# Patient Record
Sex: FEMALE | URBAN - METROPOLITAN AREA
[De-identification: names, ages, dates, MRNs, and addresses within clinical notes are randomized per-mention and may not be internally consistent; named-entity substitution may affect disease eponyms.]

---
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
## 2024-05-17 ENCOUNTER — TELEPHONE (OUTPATIENT)
Age: 19
End: 2024-05-17

## 2024-05-17 NOTE — TELEPHONE ENCOUNTER
Bear PATEL [facility] Clinical Staff (supporting Fadi Jay MD)1 hour ago (10:07 AM)     LETITIA  Great. Yes would be great if your admin could call her and start the process. Her name is Heydi “Deangelo Duncan. She goes by Sera Duncan. Her number is [phone]        MD Bear Coronado1 hour ago (9:59 AM)       Happy to do it - frequently make exceptions for family members. Want someone to call to arrange it? Lainey Rodgers routed conversation to Fadi Jay MD1 hour ago (9:56 AM)     [facility] Clinical Staff (supporting Fadi Jay MD)1 hour ago (9:55 AM)     LETITIA  She used to see Eryn Denney when she was at the pediatric practice. I know you are not accepting new patients, but I was hoping you could make an exception in this case because I am your patient and Anisha is your patient. If not, can you please advise. Thanks. Hope all is well.

## 2024-06-26 ENCOUNTER — OFFICE VISIT (OUTPATIENT)
Age: 19
End: 2024-06-26
Payer: COMMERCIAL

## 2024-06-26 VITALS
BODY MASS INDEX: 20.73 KG/M2 | HEIGHT: 65 IN | HEART RATE: 66 BPM | OXYGEN SATURATION: 100 % | WEIGHT: 124.4 LBS | DIASTOLIC BLOOD PRESSURE: 82 MMHG | RESPIRATION RATE: 15 BRPM | SYSTOLIC BLOOD PRESSURE: 146 MMHG | TEMPERATURE: 98.4 F

## 2024-06-26 DIAGNOSIS — R79.89 ABNORMAL TSH: ICD-10-CM

## 2024-06-26 DIAGNOSIS — Z71.89 ACP (ADVANCE CARE PLANNING): ICD-10-CM

## 2024-06-26 DIAGNOSIS — Z00.00 ENCOUNTER FOR WELL ADULT EXAM WITHOUT ABNORMAL FINDINGS: ICD-10-CM

## 2024-06-26 DIAGNOSIS — Z00.00 ENCOUNTER FOR WELL ADULT EXAM WITHOUT ABNORMAL FINDINGS: Primary | ICD-10-CM

## 2024-06-26 LAB
BASOPHILS # BLD: 0 K/UL (ref 0–0.1)
BASOPHILS NFR BLD: 1 % (ref 0–1)
DIFFERENTIAL METHOD BLD: NORMAL
EOSINOPHIL # BLD: 0.1 K/UL (ref 0–0.4)
EOSINOPHIL NFR BLD: 2 % (ref 0–7)
ERYTHROCYTE [DISTWIDTH] IN BLOOD BY AUTOMATED COUNT: 12.2 % (ref 11.5–14.5)
HCT VFR BLD AUTO: 41.7 % (ref 35–47)
HGB BLD-MCNC: 13.8 G/DL (ref 11.5–16)
IMM GRANULOCYTES # BLD AUTO: 0 K/UL (ref 0–0.04)
IMM GRANULOCYTES NFR BLD AUTO: 0 % (ref 0–0.5)
LYMPHOCYTES # BLD: 1.8 K/UL (ref 0.8–3.5)
LYMPHOCYTES NFR BLD: 34 % (ref 12–49)
MCH RBC QN AUTO: 28.5 PG (ref 26–34)
MCHC RBC AUTO-ENTMCNC: 33.1 G/DL (ref 30–36.5)
MCV RBC AUTO: 86.2 FL (ref 80–99)
MONOCYTES # BLD: 0.5 K/UL (ref 0–1)
MONOCYTES NFR BLD: 9 % (ref 5–13)
NEUTS SEG # BLD: 2.9 K/UL (ref 1.8–8)
NEUTS SEG NFR BLD: 54 % (ref 32–75)
NRBC # BLD: 0 K/UL (ref 0–0.01)
NRBC BLD-RTO: 0 PER 100 WBC
PLATELET # BLD AUTO: 287 K/UL (ref 150–400)
PMV BLD AUTO: 10 FL (ref 8.9–12.9)
RBC # BLD AUTO: 4.84 M/UL (ref 3.8–5.2)
T4 FREE SERPL-MCNC: 1 NG/DL (ref 0.8–1.5)
TSH SERPL DL<=0.05 MIU/L-ACNC: 2.52 UIU/ML (ref 0.36–3.74)
WBC # BLD AUTO: 5.4 K/UL (ref 3.6–11)

## 2024-06-26 PROCEDURE — 99385 PREV VISIT NEW AGE 18-39: CPT | Performed by: INTERNAL MEDICINE

## 2024-06-26 SDOH — ECONOMIC STABILITY: HOUSING INSECURITY
IN THE LAST 12 MONTHS, WAS THERE A TIME WHEN YOU DID NOT HAVE A STEADY PLACE TO SLEEP OR SLEPT IN A SHELTER (INCLUDING NOW)?: NO

## 2024-06-26 SDOH — ECONOMIC STABILITY: INCOME INSECURITY: HOW HARD IS IT FOR YOU TO PAY FOR THE VERY BASICS LIKE FOOD, HOUSING, MEDICAL CARE, AND HEATING?: NOT HARD AT ALL

## 2024-06-26 SDOH — ECONOMIC STABILITY: FOOD INSECURITY: WITHIN THE PAST 12 MONTHS, YOU WORRIED THAT YOUR FOOD WOULD RUN OUT BEFORE YOU GOT MONEY TO BUY MORE.: NEVER TRUE

## 2024-06-26 SDOH — ECONOMIC STABILITY: TRANSPORTATION INSECURITY
IN THE PAST 12 MONTHS, HAS LACK OF TRANSPORTATION KEPT YOU FROM MEETINGS, WORK, OR FROM GETTING THINGS NEEDED FOR DAILY LIVING?: NO

## 2024-06-26 SDOH — ECONOMIC STABILITY: FOOD INSECURITY: WITHIN THE PAST 12 MONTHS, THE FOOD YOU BOUGHT JUST DIDN'T LAST AND YOU DIDN'T HAVE MONEY TO GET MORE.: NEVER TRUE

## 2024-06-26 ASSESSMENT — PATIENT HEALTH QUESTIONNAIRE - PHQ9
SUM OF ALL RESPONSES TO PHQ QUESTIONS 1-9: 0
2. FEELING DOWN, DEPRESSED OR HOPELESS: NOT AT ALL
1. LITTLE INTEREST OR PLEASURE IN DOING THINGS: NOT AT ALL
SUM OF ALL RESPONSES TO PHQ9 QUESTIONS 1 & 2: 0

## 2024-06-26 NOTE — PROGRESS NOTES
Well Adult Note  Name: Heydi Duncan Today’s Date: 2024   MRN: [id number] Sex: Female   Age: 18 y.o. Ethnicity: Unavailable / Unknown   : 2005 Race: Unavailable      Heydi Duncan is here for well adult exam.  History:  Presents for a wellness exam as a new patient.  She has a previous history of elevated TSH but never was treated for that.  She exercises regularly and her weight is stable.  She is seeing her gynecologist regularly.    Review of Systems  A complete review of systems is otherwise negative.  No Known Allergies      Prior to Visit Medications    Not on File         Past Medical History:   Diagnosis Date    Heterozygous MTHFR mutation C677T     Migraines        History reviewed. No pertinent surgical history.      Family History   Problem Relation Age of Onset    Gout Father     High Cholesterol Father         takes red yeast    Vision Loss Father     Learning Disabilities Mother         dyslexia    Miscarriages / Stillbirths Mother     Vision Loss Mother     Arthritis Maternal Grandfather     Cancer Maternal Grandfather         skin cancers    Hearing Loss Maternal Grandfather     High Cholesterol Maternal Grandfather         takes red yeast    Learning Disabilities Maternal Grandfather         dyslexia    Arthritis Maternal Grandmother     Arrhythmia Maternal Grandmother     Asthma Maternal Grandmother         as a child    Birth Defects Maternal Grandmother         hole in heart    Cancer Maternal Grandmother         skin cancers    Miscarriages / Stillbirths Maternal Grandmother     Osteoporosis Maternal Grandmother        Social History     Tobacco Use    Smoking status: Never    Smokeless tobacco: Never   Substance Use Topics    Alcohol use: Never    Drug use: Never       Objective     Vital Signs  BP (!) 146/82   Pulse 66   Temp 98.4 °F (36.9 °C)   Resp 15   Ht 1.659 m (5' 5.32\")   Wt 56.4 kg (124 lb 6.4 oz)   SpO2 100%   BMI 20.50 kg/m²   Wt Readings from Last 3

## 2025-06-26 ENCOUNTER — OFFICE VISIT (OUTPATIENT)
Age: 20
End: 2025-06-26
Payer: COMMERCIAL

## 2025-06-26 VITALS
BODY MASS INDEX: 21.09 KG/M2 | DIASTOLIC BLOOD PRESSURE: 70 MMHG | OXYGEN SATURATION: 100 % | TEMPERATURE: 97.8 F | SYSTOLIC BLOOD PRESSURE: 123 MMHG | WEIGHT: 126.6 LBS | HEIGHT: 65 IN | HEART RATE: 54 BPM | RESPIRATION RATE: 16 BRPM

## 2025-06-26 DIAGNOSIS — Z00.00 ENCOUNTER FOR WELL ADULT EXAM WITHOUT ABNORMAL FINDINGS: Primary | ICD-10-CM

## 2025-06-26 PROCEDURE — 99395 PREV VISIT EST AGE 18-39: CPT | Performed by: INTERNAL MEDICINE

## 2025-06-26 SDOH — ECONOMIC STABILITY: FOOD INSECURITY: WITHIN THE PAST 12 MONTHS, YOU WORRIED THAT YOUR FOOD WOULD RUN OUT BEFORE YOU GOT MONEY TO BUY MORE.: NEVER TRUE

## 2025-06-26 SDOH — ECONOMIC STABILITY: FOOD INSECURITY: WITHIN THE PAST 12 MONTHS, THE FOOD YOU BOUGHT JUST DIDN'T LAST AND YOU DIDN'T HAVE MONEY TO GET MORE.: NEVER TRUE

## 2025-06-26 ASSESSMENT — PATIENT HEALTH QUESTIONNAIRE - PHQ9
SUM OF ALL RESPONSES TO PHQ QUESTIONS 1-9: 0
SUM OF ALL RESPONSES TO PHQ QUESTIONS 1-9: 0
2. FEELING DOWN, DEPRESSED OR HOPELESS: NOT AT ALL
1. LITTLE INTEREST OR PLEASURE IN DOING THINGS: NOT AT ALL
SUM OF ALL RESPONSES TO PHQ QUESTIONS 1-9: 0
SUM OF ALL RESPONSES TO PHQ QUESTIONS 1-9: 0

## 2025-06-26 NOTE — PROGRESS NOTES
Well Adult Note  Name: Heydi Duncan Today’s Date: 2025   MRN: [id number] Sex: Female   Age: 19 y.o. Ethnicity: Non- / Non    : 2005 Race: White (non-)      Heydi Duncan is here for a well adult exam.       Assessment & Plan   Encounter for well adult exam without abnormal findings-will get her shot record.  Also encouraged her to consider getting Gardasil.      Return in 1 year (on 2026) for CPE (Physical Exam).       Subjective   History:  Presents for a wellness exam and a follow-up.  She is planning to spend in  in [place] and needs paperwork filled out for that.  She is currently exercising regularly, taking no medications and dealing with no health issues.  She is seeing the gynecologist next week.  Per her report she has not had a Gardasil injection as her mom refused in the past.    Review of Systems  Per HPI.  No Known Allergies  Prior to Visit Medications    Not on File     Past Medical History:   Diagnosis Date    Heterozygous MTHFR mutation C677T     Migraines      No past surgical history on file.  Family History   Problem Relation Age of Onset    Gout Father     High Cholesterol Father         takes red yeast    Vision Loss Father     Learning Disabilities Mother         dyslexia    Miscarriages / Stillbirths Mother     Vision Loss Mother     Arthritis Maternal Grandfather     Cancer Maternal Grandfather         skin cancers    Hearing Loss Maternal Grandfather     High Cholesterol Maternal Grandfather         takes red yeast    Learning Disabilities Maternal Grandfather         dyslexia    Arthritis Maternal Grandmother     Arrhythmia Maternal Grandmother     Asthma Maternal Grandmother         as a child    Birth Defects Maternal Grandmother         hole in heart    Cancer Maternal Grandmother         skin cancers    Miscarriages / Stillbirths Maternal Grandmother     Osteoporosis Maternal Grandmother      Social History     Tobacco Use